# Patient Record
Sex: FEMALE | Employment: PART TIME | URBAN - METROPOLITAN AREA
[De-identification: names, ages, dates, MRNs, and addresses within clinical notes are randomized per-mention and may not be internally consistent; named-entity substitution may affect disease eponyms.]

---

## 2018-10-15 ENCOUNTER — OFFICE VISIT (OUTPATIENT)
Dept: URGENT CARE | Facility: CLINIC | Age: 18
End: 2018-10-15
Payer: COMMERCIAL

## 2018-10-15 VITALS
BODY MASS INDEX: 31.21 KG/M2 | DIASTOLIC BLOOD PRESSURE: 70 MMHG | RESPIRATION RATE: 16 BRPM | HEIGHT: 62 IN | WEIGHT: 169.6 LBS | OXYGEN SATURATION: 100 % | SYSTOLIC BLOOD PRESSURE: 128 MMHG | TEMPERATURE: 99.2 F | HEART RATE: 60 BPM

## 2018-10-15 DIAGNOSIS — J98.8 RTI (RESPIRATORY TRACT INFECTION): ICD-10-CM

## 2018-10-15 DIAGNOSIS — J02.0 STREP PHARYNGITIS: ICD-10-CM

## 2018-10-15 LAB
INT CON NEG: NEGATIVE
INT CON POS: POSITIVE
S PYO AG THROAT QL: NORMAL

## 2018-10-15 PROCEDURE — 99214 OFFICE O/P EST MOD 30 MIN: CPT | Performed by: FAMILY MEDICINE

## 2018-10-15 PROCEDURE — 87880 STREP A ASSAY W/OPTIC: CPT | Performed by: FAMILY MEDICINE

## 2018-10-15 RX ORDER — PROMETHAZINE HYDROCHLORIDE AND PHENYLEPHRINE HYDROCHLORIDE 6.25; 5 MG/5ML; MG/5ML
SYRUP ORAL
Qty: 120 ML | Refills: 1 | Status: SHIPPED | OUTPATIENT
Start: 2018-10-15 | End: 2018-12-21

## 2018-10-15 RX ORDER — AZITHROMYCIN 250 MG/1
TABLET, FILM COATED ORAL
Qty: 6 TAB | Refills: 0 | Status: SHIPPED | OUTPATIENT
Start: 2018-10-15 | End: 2018-12-21

## 2018-10-15 ASSESSMENT — ENCOUNTER SYMPTOMS
SPUTUM PRODUCTION: 1
FEVER: 1
COUGH: 1
SORE THROAT: 1
DIZZINESS: 0
NAUSEA: 0
VOMITING: 0

## 2018-10-15 NOTE — LETTER
October 15, 2018         Patient: Carol Ann Rosales   YOB: 2000   Date of Visit: 10/15/2018           To Whom it May Concern:    Carol Ann Rosales was seen in my clinic on 10/15/2018. She may return to work/school in 2-3 days.    If you have any questions or concerns, please don't hesitate to call.        Sincerely,           Micahel Castano M.D.  Electronically Signed

## 2018-10-16 NOTE — PROGRESS NOTES
"Subjective:      Carol Ann Rosales is a 18 y.o. female who presents with Pharyngitis (x wednesday, fever, cough )      - This is a very pleasant, well and non-toxic appearing 18 y.o. female with complaints of 5-6 days sore throat along w/ some cough and sinus and sore throat and subjective fevers           ALLERGIES:  Pcn [penicillins]     PMH:  Past Medical History:   Diagnosis Date   • Adverse reaction to food    • Itching    • Otitis media    • Skin abnormalities    • Sneezing         MEDS:    Current Outpatient Prescriptions:   •  azithromycin (ZITHROMAX) 250 MG Tab, Use as directed, Disp: 6 Tab, Rfl: 0  •  Promethazine-Phenylephrine 6.25-5 MG/5ML Syrup, 5ml q8hrd prn, Disp: 120 mL, Rfl: 1    ** I have documented what I find to be significant in regards to past medical, social, family and surgical history  in my HPI or under PMH/PSH/FH review section, otherwise it is contributory **           HPI    Review of Systems   Constitutional: Positive for fever.   HENT: Positive for congestion and sore throat.    Respiratory: Positive for cough and sputum production.    Gastrointestinal: Negative for nausea and vomiting.   Neurological: Negative for dizziness.   All other systems reviewed and are negative.         Objective:     /70   Pulse 60   Temp 37.3 °C (99.2 °F)   Resp 16   Ht 1.575 m (5' 2\")   Wt 76.9 kg (169 lb 9.6 oz)   LMP 10/01/2018   SpO2 100%   Breastfeeding? No   BMI 31.02 kg/m²      Physical Exam   Constitutional: She appears well-developed. No distress.   HENT:   Head: Normocephalic and atraumatic.   Mouth/Throat: Oropharynx is clear and moist.   Eyes: Conjunctivae are normal.   Neck: Neck supple.   Cardiovascular: Regular rhythm.    No murmur heard.  Pulmonary/Chest: Effort normal and breath sounds normal. No respiratory distress.   Neurological: She is alert. She exhibits normal muscle tone.   Skin: Skin is warm and dry.   Psychiatric: She has a normal mood and affect. Judgment normal. "   Nursing note and vitals reviewed.              Assessment/Plan:       1. Strep pharyngitis  POCT Rapid Strep A    azithromycin (ZITHROMAX) 250 MG Tab   2. RTI (respiratory tract infection)  Promethazine-Phenylephrine 6.25-5 MG/5ML Syrup             Dx & d/c instructions discussed w/ patient and/or family members.     ER precautions (worsening signs symptoms and when to go to ER) discussed.    Follow up w/ PCP in 2-3 days to make sure symptoms improving and no further intervention/treatment and/or work-up needed was advised, ER if feeling worse or not improving in 2 days.    Possible side effects (i.e. Rash, GI upset/constipation, sedation, elevation of BP or sugars) of any medications given discussed.     Patient left in stable condition

## 2018-12-21 ENCOUNTER — OFFICE VISIT (OUTPATIENT)
Dept: URGENT CARE | Facility: MEDICAL CENTER | Age: 18
End: 2018-12-21
Payer: COMMERCIAL

## 2018-12-21 VITALS
HEART RATE: 60 BPM | OXYGEN SATURATION: 98 % | SYSTOLIC BLOOD PRESSURE: 120 MMHG | TEMPERATURE: 98.2 F | WEIGHT: 165 LBS | HEIGHT: 62 IN | RESPIRATION RATE: 16 BRPM | BODY MASS INDEX: 30.36 KG/M2 | DIASTOLIC BLOOD PRESSURE: 78 MMHG

## 2018-12-21 DIAGNOSIS — J02.9 SORE THROAT: ICD-10-CM

## 2018-12-21 LAB
INT CON NEG: NORMAL
INT CON POS: NORMAL
S PYO AG THROAT QL: NEGATIVE

## 2018-12-21 PROCEDURE — 99214 OFFICE O/P EST MOD 30 MIN: CPT | Performed by: FAMILY MEDICINE

## 2018-12-21 PROCEDURE — 87880 STREP A ASSAY W/OPTIC: CPT | Performed by: FAMILY MEDICINE

## 2018-12-21 RX ORDER — AZITHROMYCIN 250 MG/1
TABLET, FILM COATED ORAL
Qty: 6 TAB | Refills: 0 | Status: SHIPPED | OUTPATIENT
Start: 2018-12-21

## 2018-12-21 ASSESSMENT — ENCOUNTER SYMPTOMS
FOCAL WEAKNESS: 0
SPUTUM PRODUCTION: 0
DIZZINESS: 0
CHILLS: 0
COUGH: 0
SORE THROAT: 1
FEVER: 0

## 2018-12-21 NOTE — PROGRESS NOTES
"Subjective:      Carol Ann Rosales is a 18 y.o. female who presents with Pharyngitis      - This is a very pleasant, well and non-toxic appearing 18 y.o. female with complaints of throat sore x 2 days. No NVFC          ALLERGIES:  Pcn [penicillins]     PMH:  Past Medical History:   Diagnosis Date   • Adverse reaction to food    • Itching    • Otitis media    • Skin abnormalities    • Sneezing         MEDS:    Current Outpatient Prescriptions:   •  azithromycin (ZITHROMAX) 250 MG Tab, Use as directed, Disp: 6 Tab, Rfl: 0    ** I have documented what I find to be significant in regards to past medical, social, family and surgical history  in my HPI or under PMH/PSH/FH review section, otherwise it is contributory **           HPI    Review of Systems   Constitutional: Negative for chills and fever.   HENT: Positive for sore throat. Negative for congestion.    Respiratory: Negative for cough and sputum production.    Neurological: Negative for dizziness and focal weakness.          Objective:     /78   Pulse 60   Temp 36.8 °C (98.2 °F) (Temporal)   Resp 16   Ht 1.575 m (5' 2\")   Wt 74.8 kg (165 lb)   LMP 12/07/2018   SpO2 98%   Breastfeeding? No   BMI 30.18 kg/m²      Physical Exam   Constitutional: She appears well-developed. No distress.   HENT:   Head: Normocephalic and atraumatic.   Mouth/Throat: Oropharyngeal exudate present.   Eyes: Conjunctivae are normal.   Neck: Neck supple.   Cardiovascular: Regular rhythm.    No murmur heard.  Pulmonary/Chest: Effort normal and breath sounds normal. No respiratory distress.   Lymphadenopathy:     She has no cervical adenopathy.   Neurological: She is alert. She exhibits normal muscle tone.   Skin: Skin is warm and dry.   Psychiatric: She has a normal mood and affect. Judgment normal.   Nursing note and vitals reviewed.  Lt tonsil injected, Grade III w/ exudate             Assessment/Plan:         1. Sore throat  POCT Rapid Strep A    azithromycin (ZITHROMAX) " 250 MG Tab             Dx & d/c instructions discussed w/ patient and/or family members.     ER precautions (worsening signs symptoms and when to go to ER) discussed.    Follow up w/ PCP in 2-3 days to make sure symptoms improving and no further intervention/treatment and/or work-up needed was advised, ER if feeling worse or not improving in 2 days.    Possible side effects (i.e. Rash, GI upset/constipation, sedation, elevation of BP or sugars) of any medications given discussed.     Patient left in stable condition

## 2019-11-12 ENCOUNTER — OFFICE VISIT (OUTPATIENT)
Dept: URGENT CARE | Facility: CLINIC | Age: 19
End: 2019-11-12
Payer: COMMERCIAL

## 2019-11-12 VITALS
HEIGHT: 62 IN | SYSTOLIC BLOOD PRESSURE: 114 MMHG | WEIGHT: 162.2 LBS | BODY MASS INDEX: 29.85 KG/M2 | TEMPERATURE: 98.8 F | OXYGEN SATURATION: 98 % | DIASTOLIC BLOOD PRESSURE: 68 MMHG | RESPIRATION RATE: 18 BRPM | HEART RATE: 109 BPM

## 2019-11-12 DIAGNOSIS — B37.0 ORAL CANDIDIASIS: ICD-10-CM

## 2019-11-12 PROCEDURE — 99214 OFFICE O/P EST MOD 30 MIN: CPT | Performed by: PHYSICIAN ASSISTANT

## 2019-11-12 ASSESSMENT — ENCOUNTER SYMPTOMS
HEADACHES: 0
EYE PAIN: 0
BLURRED VISION: 0
DIZZINESS: 0
SHORTNESS OF BREATH: 0
VOMITING: 0
CHILLS: 1
MYALGIAS: 0
DIARRHEA: 0
SORE THROAT: 1
FEVER: 1
NAUSEA: 0
COUGH: 0
ABDOMINAL PAIN: 0
PALPITATIONS: 0

## 2019-11-12 NOTE — PROGRESS NOTES
Subjective:      Carol Ann Rosales is a 19 y.o. female who presents with Pharyngitis (was sent at [facility] on Saturday); Fever; and Other (bleeding and white stuff around her gums)    HPI:  This is a new problem. Carol Ann Rosales is a 19 y.o. female who presents today for evaluation of mouth pain.  Patient states that at the end of last week she started to get sick with fever and sore throat.  She was seen [facility] over the weekend and had rapid flu and strep done which were both negative.  She was prescribed Tamiflu.  She said over the weekend her temperature was getting to as high as 104 but yesterday it was only 101 and today she has had no fever.  She states that on Sunday she started to have some mouth pain and yesterday she had a little bit of bleeding in her gums after yesterday's.  She this morning when she woke up her gums were bleeding a little bit again.  She has no current fever/chills, body aches, cough, chest pain, or shortness of breath.  She states that she contacted her dentist who prescribed her some Magic mouthwash but that has been providing very minimal relief.  She says her throat itself is actually starting to feel better but the pain in her mouth is her main concern now.      Review of Systems   Constitutional: Positive for chills (Resolved) and fever (Resolved).   HENT: Positive for sore throat. Negative for ear pain.         Mouth pain, bleeding gums   Eyes: Negative for blurred vision and pain.   Respiratory: Negative for cough and shortness of breath.    Cardiovascular: Negative for chest pain and palpitations.   Gastrointestinal: Negative for abdominal pain, diarrhea, nausea and vomiting.   Musculoskeletal: Negative for myalgias.   Neurological: Negative for dizziness and headaches.       PMH:  has a past medical history of Adverse reaction to food, Itching, Otitis media, Skin abnormalities, and Sneezing.  MEDS:   Current Outpatient Medications:   •  Oseltamivir  "Phosphate (TAMIFLU PO), Take  by mouth., Disp: , Rfl:   •  Alum & Mag Hydroxide-Simeth (MAALOX PLUS-BENADRYL-XYLOCAINE), Take 5 mL by mouth every 6 hours as needed., Disp: , Rfl:   •  nystatin (MYCOSTATIN) 435569 UNIT/ML Suspension, Take 5 mL by mouth 4 times a day for 10 days. Swish in the mouth and retain for as long as possible (several minutes) before swallowing, Disp: 200 mL, Rfl: 0  •  azithromycin (ZITHROMAX) 250 MG Tab, Use as directed, Disp: 6 Tab, Rfl: 0  ALLERGIES:   Allergies   Allergen Reactions   • Pcn [Penicillins] Rash     SURGHX: No past surgical history on file.  SOCHX:  reports that she has never smoked. She has never used smokeless tobacco.  FH: Family history was reviewed, no pertinent findings to report     Objective:     /68 (BP Location: Right arm, Patient Position: Sitting, BP Cuff Size: Adult)   Pulse (!) 109   Temp 37.1 °C (98.8 °F) (Temporal)   Resp 18   Ht 1.575 m (5' 2\")   Wt 73.6 kg (162 lb 3.2 oz)   LMP 10/24/2019   SpO2 98%   BMI 29.67 kg/m²      Physical Exam  Constitutional:       Appearance: She is well-developed.   HENT:      Head: Normocephalic and atraumatic.      Right Ear: Tympanic membrane, ear canal and external ear normal.      Left Ear: Tympanic membrane, ear canal and external ear normal.      Nose: Nose normal.      Mouth/Throat:      Lips: Pink.      Mouth: Mucous membranes are moist.      Dentition: Gingival swelling (Mild) present.      Pharynx: Posterior oropharyngeal erythema (Mild) present.      Tonsils: Tonsillar exudate (Left side) present. Swellin+ on the right. 1+ on the left.      Comments: White film overlying areas on upper and lower gumlines as well as tongue. It can be easily scraped off  Eyes:      Conjunctiva/sclera: Conjunctivae normal.      Pupils: Pupils are equal, round, and reactive to light.   Neck:      Musculoskeletal: Normal range of motion.   Cardiovascular:      Rate and Rhythm: Normal rate and regular rhythm.      Heart " sounds: Normal heart sounds. No murmur.   Pulmonary:      Effort: Pulmonary effort is normal.      Breath sounds: Normal breath sounds. No wheezing.   Lymphadenopathy:      Cervical: No cervical adenopathy.   Skin:     General: Skin is warm and dry.      Capillary Refill: Capillary refill takes less than 2 seconds.   Neurological:      Mental Status: She is alert and oriented to person, place, and time.   Psychiatric:         Behavior: Behavior normal.         Judgment: Judgment normal.            Assessment/Plan:     1. Oral candidiasis  - nystatin (MYCOSTATIN) [id number] UNIT/ML Suspension; Take 5 mL by mouth 4 times a day for 10 days. Swish in the mouth and retain for as long as possible (several minutes) before swallowing  Dispense: 200 mL; Refill: 0  *Physical exam most consistent with thrush at this point.  We will have patient try the nystatin rinse.  If symptoms do not improve or if they start to worsen within the next 5 days she should return for reevaluation.          Differential Diagnosis, natural history, and supportive care discussed. Return to the Urgent Care or follow up with your PCP if symptoms fail to resolve, or for any new or worsening symptoms. Emergency room precautions discussed. Patient and/or family appears understanding of information.